# Patient Record
Sex: MALE | Race: WHITE | NOT HISPANIC OR LATINO | URBAN - METROPOLITAN AREA
[De-identification: names, ages, dates, MRNs, and addresses within clinical notes are randomized per-mention and may not be internally consistent; named-entity substitution may affect disease eponyms.]

---

## 2022-02-22 ENCOUNTER — WEB ENCOUNTER (OUTPATIENT)
Dept: URBAN - METROPOLITAN AREA CLINIC 40 | Facility: CLINIC | Age: 77
End: 2022-02-22

## 2022-02-22 ENCOUNTER — CLAIMS CREATED FROM THE CLAIM WINDOW (OUTPATIENT)
Dept: URBAN - METROPOLITAN AREA CLINIC 40 | Facility: CLINIC | Age: 77
End: 2022-02-22
Payer: MEDICARE

## 2022-02-22 ENCOUNTER — LAB OUTSIDE AN ENCOUNTER (OUTPATIENT)
Dept: URBAN - METROPOLITAN AREA CLINIC 40 | Facility: CLINIC | Age: 77
End: 2022-02-22

## 2022-02-22 DIAGNOSIS — K92.1 RECTAL BLEEDING: ICD-10-CM

## 2022-02-22 DIAGNOSIS — R10.13 EPIGASTRIC PAIN: ICD-10-CM

## 2022-02-22 DIAGNOSIS — Z86.010 HISTORY OF COLON POLYPS: ICD-10-CM

## 2022-02-22 PROCEDURE — 99203 OFFICE O/P NEW LOW 30 MIN: CPT | Performed by: INTERNAL MEDICINE

## 2022-02-22 RX ORDER — SUCRALFATE 1 G/10ML
10 ML ON AN EMPTY STOMACH SUSPENSION ORAL TWICE A DAY
Status: ACTIVE | COMMUNITY

## 2022-02-22 RX ORDER — PANTOPRAZOLE SODIUM 40 MG/1
AS DIRECTED TABLET, DELAYED RELEASE ORAL
OUTPATIENT

## 2022-02-22 RX ORDER — PANTOPRAZOLE SODIUM 40 MG/1
AS DIRECTED TABLET, DELAYED RELEASE ORAL
Status: ACTIVE | COMMUNITY

## 2022-02-22 RX ORDER — SUCRALFATE 1 G/10ML
10 ML ON AN EMPTY STOMACH SUSPENSION ORAL TWICE A DAY
OUTPATIENT

## 2022-02-22 RX ORDER — POLYETHYLENE GLYCOL 3350, SODIUM SULFATE, SODIUM CHLORIDE, POTASSIUM CHLORIDE, ASCORBIC ACID, SODIUM ASCORBATE 140-9-5.2G
AS DIRECTED KIT ORAL ONCE
Qty: 1 | Refills: 0 | OUTPATIENT
Start: 2022-02-22 | End: 2022-02-23

## 2022-02-22 RX ORDER — LOSARTAN POTASSIUM 25 MG/1
AS DIRECTED TABLET ORAL
Status: ACTIVE | COMMUNITY

## 2022-02-22 RX ORDER — FINASTERIDE 5 MG/1
TAKE 1 TABLET (5 MG) BY ORAL ROUTE ONCE DAILY TABLET, FILM COATED ORAL 1
Qty: 0 | Refills: 0 | Status: ACTIVE | COMMUNITY
Start: 1900-01-01

## 2022-02-22 NOTE — PHYSICAL EXAM GASTROINTESTINAL
Abdomen , soft, mild RUFUS tenderness, nondistended , no guarding or rigidity , no masses palpable , normal bowel sounds , Liver and Spleen , no hepatomegaly present , no hepatosplenomegaly , liver nontender , spleen not palpable

## 2022-02-22 NOTE — HPI-TODAY'S VISIT:
Mr. Lind is a 76-year-old white male presenting for new patient evaluation for rectal bleeding. Patient is a former patient of Dr. Benoit. His last colonoscopy with  Dr. Benoit was August 20 18 where he had an adenomatous polyp removed from the hepatic flexure as well as sigmoid diverticulosis. Patient also has a history of peptic ulcer disease. Over the last few weeks patient has had a few episodes of blood in the bottom of the toilet bowl. He has a history of hemorrhoids and has intermittently seen some bright red blood on the tissue paper as well. He admits to occasional straining. Stools are soft. He moves his bowels usually twice a day. He also is noting a gnawing feeling in his stomach. States that that lasted 3 to 4 days after  it initially started. Took pantoprazole for couple of days and it went away but then returned a week later. He saw his primary care physician who started him on sucralfate and did blood work. He denies any NSAID use.

## 2022-03-14 ENCOUNTER — CLAIMS CREATED FROM THE CLAIM WINDOW (OUTPATIENT)
Dept: URBAN - METROPOLITAN AREA CLINIC 4 | Facility: CLINIC | Age: 77
End: 2022-03-14
Payer: MEDICARE

## 2022-03-14 ENCOUNTER — OFFICE VISIT (OUTPATIENT)
Dept: URBAN - METROPOLITAN AREA SURGERY CENTER 30 | Facility: SURGERY CENTER | Age: 77
End: 2022-03-14
Payer: MEDICARE

## 2022-03-14 DIAGNOSIS — K29.80 ACUTE DUODENITIS: ICD-10-CM

## 2022-03-14 DIAGNOSIS — K21.9 GASTRO-ESOPHAGEAL REFLUX DISEASE WITHOUT ESOPHAGITIS: ICD-10-CM

## 2022-03-14 DIAGNOSIS — Z86.010 ADENOMAS PERSONAL HISTORY OF COLONIC POLYPS: ICD-10-CM

## 2022-03-14 DIAGNOSIS — K31.A0 GASTRIC INTESTINAL METAPLASIA, UNSPECIFIED: ICD-10-CM

## 2022-03-14 DIAGNOSIS — K29.60 ADENOPAPILLOMATOSIS GASTRICA: ICD-10-CM

## 2022-03-14 DIAGNOSIS — K31.7 POLYP OF STOMACH AND DUODENUM: ICD-10-CM

## 2022-03-14 DIAGNOSIS — K29.81 DUODENITIS WITH BLEEDING: ICD-10-CM

## 2022-03-14 DIAGNOSIS — K62.5 ANAL BLEEDING: ICD-10-CM

## 2022-03-14 DIAGNOSIS — K31.7 BENIGN GASTRIC POLYP: ICD-10-CM

## 2022-03-14 DIAGNOSIS — K21.9 ACID REFLUX: ICD-10-CM

## 2022-03-14 PROCEDURE — 88312 SPECIAL STAINS GROUP 1: CPT | Performed by: PATHOLOGY

## 2022-03-14 PROCEDURE — 45378 DIAGNOSTIC COLONOSCOPY: CPT | Performed by: INTERNAL MEDICINE

## 2022-03-14 PROCEDURE — G8907 PT DOC NO EVENTS ON DISCHARG: HCPCS | Performed by: INTERNAL MEDICINE

## 2022-03-14 PROCEDURE — 88305 TISSUE EXAM BY PATHOLOGIST: CPT | Performed by: PATHOLOGY

## 2022-03-14 PROCEDURE — 43239 EGD BIOPSY SINGLE/MULTIPLE: CPT | Performed by: INTERNAL MEDICINE

## 2022-03-14 PROCEDURE — 88341 IMHCHEM/IMCYTCHM EA ADD ANTB: CPT | Performed by: PATHOLOGY

## 2022-03-14 PROCEDURE — 88342 IMHCHEM/IMCYTCHM 1ST ANTB: CPT | Performed by: PATHOLOGY

## 2022-03-29 ENCOUNTER — OFFICE VISIT (OUTPATIENT)
Dept: URBAN - METROPOLITAN AREA CLINIC 40 | Facility: CLINIC | Age: 77
End: 2022-03-29
Payer: MEDICARE

## 2022-03-29 DIAGNOSIS — Z86.010 HISTORY OF COLON POLYPS: ICD-10-CM

## 2022-03-29 DIAGNOSIS — K29.90 GASTRITIS AND DUODENITIS: ICD-10-CM

## 2022-03-29 DIAGNOSIS — K64.9 HEMORRHOIDS, UNSPECIFIED HEMORRHOID TYPE: ICD-10-CM

## 2022-03-29 DIAGNOSIS — R10.13 EPIGASTRIC PAIN: ICD-10-CM

## 2022-03-29 DIAGNOSIS — K92.1 RECTAL BLEEDING: ICD-10-CM

## 2022-03-29 PROCEDURE — 99214 OFFICE O/P EST MOD 30 MIN: CPT | Performed by: INTERNAL MEDICINE

## 2022-03-29 RX ORDER — SUCRALFATE 1 G/10ML
10 ML ON AN EMPTY STOMACH SUSPENSION ORAL TWICE A DAY
Status: ACTIVE | COMMUNITY

## 2022-03-29 RX ORDER — PANTOPRAZOLE SODIUM 40 MG/1
AS DIRECTED TABLET, DELAYED RELEASE ORAL ONCE A DAY
Qty: 90 TABLET | Refills: 3 | OUTPATIENT

## 2022-03-29 RX ORDER — SUCRALFATE 1 G/10ML
10 ML ON AN EMPTY STOMACH SUSPENSION ORAL TWICE A DAY
OUTPATIENT

## 2022-03-29 RX ORDER — FINASTERIDE 5 MG/1
TAKE 1 TABLET (5 MG) BY ORAL ROUTE ONCE DAILY TABLET, FILM COATED ORAL 1
Qty: 0 | Refills: 0 | Status: ACTIVE | COMMUNITY
Start: 1900-01-01

## 2022-03-29 RX ORDER — PANTOPRAZOLE SODIUM 40 MG/1
AS DIRECTED TABLET, DELAYED RELEASE ORAL
Status: ACTIVE | COMMUNITY

## 2022-03-29 RX ORDER — LOSARTAN POTASSIUM 25 MG/1
AS DIRECTED TABLET ORAL
Status: ACTIVE | COMMUNITY

## 2022-03-29 NOTE — HPI-TODAY'S VISIT:
Mr. Lind is a 76-year-old white male presenting for new patient evaluation for rectal bleeding. Patient is a former patient of Dr. Benoit. His last colonoscopy with  Dr. Benoit was August 20 18 where he had an adenomatous polyp removed from the hepatic flexure as well as sigmoid diverticulosis. Patient also has a history of peptic ulcer disease. Over the last few weeks patient has had a few episodes of blood in the bottom of the toilet bowl. He has a history of hemorrhoids and has intermittently seen some bright red blood on the tissue paper as well. He admits to occasional straining. Stools are soft. He moves his bowels usually twice a day. He also is noting a gnawing feeling in his stomach. States that that lasted 3 to 4 days after  it initially started. Took pantoprazole for couple of days and it went away but then returned a week later. He saw his primary care physician who started him on sucralfate and did blood work. He denies any NSAID use. Patient underwent EGD and colonoscopy on 3/14/2022.  The endoscopy revealed an irregular Z-line with patchy severe gastric inflammation seen a small gastric polyp was present and moderate inflammation was seen in the duodenal bulb.  Esophageal biopsies revealed benign inflammation gastric biopsies showed chronic inactive gastritis with no evidence of active H. pylori duodenal biopsies showed active inflammation and gastric biopsies of the polyp revealed this to be benign hyperplastic.  Patient's colonoscopy showed small hemorrhoids and multiple sigmoid diverticuli were present, the exam was otherwise unremarkable. Patient returns for follow-up on 3/29/2022.  He states that he is feeling well, and having no abdominal pain.  He has had no recent bleeding episodes, his bowel movements have been normal.  He denies any swallowing issues.  He has completed his course of sucralfate suspension, but and has only been taking pantoprazole on an as-needed basis.  I reviewed in detail the results of his endoscopy which showed severe gastritis and moderate duodenitis.  I explained that he would benefit from once daily pantoprazole, and if he should experience episodes of abdominal discomfort during the day to take an additional dose in the evening.  At her return visit we will begin tapering the medication as appropriate.  I reviewed his colonoscopy results, which showed small internal hemorrhoids.  If he should experience any symptoms from these hemorrhoids, he would benefit from applying Preparation H.  We discussed diverticulosis in detail, and the need to avoid being constipated, drinking enough fluids eating enough fiber to promote normal bowel movements.

## 2022-06-15 PROBLEM — 428283002 HISTORY OF POLYP OF COLON: Status: ACTIVE | Noted: 2022-02-22

## 2022-06-15 PROBLEM — 196731005: Status: ACTIVE | Noted: 2022-03-26

## 2022-06-21 ENCOUNTER — OFFICE VISIT (OUTPATIENT)
Dept: URBAN - METROPOLITAN AREA CLINIC 40 | Facility: CLINIC | Age: 77
End: 2022-06-21
Payer: MEDICARE

## 2022-06-21 ENCOUNTER — DASHBOARD ENCOUNTERS (OUTPATIENT)
Age: 77
End: 2022-06-21

## 2022-06-21 VITALS
DIASTOLIC BLOOD PRESSURE: 78 MMHG | HEART RATE: 70 BPM | SYSTOLIC BLOOD PRESSURE: 120 MMHG | HEIGHT: 72 IN | BODY MASS INDEX: 28.04 KG/M2 | TEMPERATURE: 98.2 F | WEIGHT: 207 LBS

## 2022-06-21 DIAGNOSIS — K92.1 RECTAL BLEEDING: ICD-10-CM

## 2022-06-21 DIAGNOSIS — R10.13 EPIGASTRIC PAIN: ICD-10-CM

## 2022-06-21 DIAGNOSIS — Z86.010 HISTORY OF COLON POLYPS: ICD-10-CM

## 2022-06-21 DIAGNOSIS — K29.90 GASTRITIS AND DUODENITIS: ICD-10-CM

## 2022-06-21 DIAGNOSIS — K64.9 HEMORRHOIDS, UNSPECIFIED HEMORRHOID TYPE: ICD-10-CM

## 2022-06-21 PROCEDURE — 99214 OFFICE O/P EST MOD 30 MIN: CPT | Performed by: INTERNAL MEDICINE

## 2022-06-21 RX ORDER — PANTOPRAZOLE SODIUM 40 MG/1
AS DIRECTED TABLET, DELAYED RELEASE ORAL ONCE A DAY
Qty: 90 TABLET | Refills: 3 | Status: ACTIVE | COMMUNITY

## 2022-06-21 RX ORDER — FINASTERIDE 5 MG/1
TAKE 1 TABLET (5 MG) BY ORAL ROUTE ONCE DAILY TABLET, FILM COATED ORAL 1
Qty: 0 | Refills: 0 | Status: ACTIVE | COMMUNITY
Start: 1900-01-01

## 2022-06-21 RX ORDER — SUCRALFATE 1 G/10ML
10 ML ON AN EMPTY STOMACH SUSPENSION ORAL TWICE A DAY
Status: ACTIVE | COMMUNITY

## 2022-06-21 RX ORDER — SUCRALFATE 1 G/10ML
10 ML ON AN EMPTY STOMACH SUSPENSION ORAL TWICE A DAY
OUTPATIENT

## 2022-06-21 RX ORDER — LOSARTAN POTASSIUM 25 MG/1
AS DIRECTED TABLET ORAL
Status: ACTIVE | COMMUNITY

## 2022-06-21 RX ORDER — PANTOPRAZOLE SODIUM 40 MG/1
AS DIRECTED TABLET, DELAYED RELEASE ORAL ONCE A DAY
Qty: 90 TABLET | Refills: 3 | OUTPATIENT

## 2022-06-21 NOTE — HPI-TODAY'S VISIT:
Mr. Lind is a 76-year-old white male presenting for new patient evaluation for rectal bleeding. Patient is a former patient of Dr. Benoit. His last colonoscopy with  Dr. Benoit was August 20 18 where he had an adenomatous polyp removed from the hepatic flexure as well as sigmoid diverticulosis. Patient also has a history of peptic ulcer disease. Over the last few weeks patient has had a few episodes of blood in the bottom of the toilet bowl. He has a history of hemorrhoids and has intermittently seen some bright red blood on the tissue paper as well. He admits to occasional straining. Stools are soft. He moves his bowels usually twice a day. He also is noting a gnawing feeling in his stomach. States that that lasted 3 to 4 days after  it initially started. Took pantoprazole for couple of days and it went away but then returned a week later. He saw his primary care physician who started him on sucralfate and did blood work. He denies any NSAID use. Patient underwent EGD and colonoscopy on 3/14/2022.  The endoscopy revealed an irregular Z-line with patchy severe gastric inflammation seen a small gastric polyp was present and moderate inflammation was seen in the duodenal bulb.  Esophageal biopsies revealed benign inflammation gastric biopsies showed chronic inactive gastritis with no evidence of active H. pylori duodenal biopsies showed active inflammation and gastric biopsies of the polyp revealed this to be benign hyperplastic.  Patient's colonoscopy showed small hemorrhoids and multiple sigmoid diverticuli were present, the exam was otherwise unremarkable. Patient returns for follow-up on 3/29/2022.  He states that he is feeling well, and having no abdominal pain.  He has had no recent bleeding episodes, his bowel movements have been normal.  He denies any swallowing issues.  He has completed his course of sucralfate suspension, but and has only been taking pantoprazole on an as-needed basis.  I reviewed in detail the results of his endoscopy which showed severe gastritis and moderate duodenitis.  I explained that he would benefit from once daily pantoprazole, and if he should experience episodes of abdominal discomfort during the day to take an additional dose in the evening.  At her return visit we will begin tapering the medication as appropriate.  I reviewed his colonoscopy results, which showed small internal hemorrhoids.  If he should experience any symptoms from these hemorrhoids, he would benefit from applying Preparation H.  We discussed diverticulosis in detail, and the need to avoid being constipated, drinking enough fluids eating enough fiber to promote normal bowel movements. Patient returns for follow-up on 6/21/2022.  Overall has been doing very well from a GI standpoint, and has been taking pantoprazole on a periodic basis.  He denies any abdominal pain, has had no further bleeding, his bowel movements have been normal.  He has had no issues with swallowing.  I advised him that should he experience recurrent abdominal pain then I would go back on pantoprazole daily for at least a week then taper slowly to every other week.

## 2023-01-10 NOTE — PHYSICAL EXAM LYMPHATIC:
Neck , no lymphadenopathy Surgeon: Apoorva Lantigua MD [facility] Text: It is unknown if this medication can cause problems during pregnancy and breastfeeding.